# Patient Record
Sex: MALE | Race: WHITE | NOT HISPANIC OR LATINO | Employment: FULL TIME | URBAN - METROPOLITAN AREA
[De-identification: names, ages, dates, MRNs, and addresses within clinical notes are randomized per-mention and may not be internally consistent; named-entity substitution may affect disease eponyms.]

---

## 2017-02-22 ENCOUNTER — HOSPITAL ENCOUNTER (OUTPATIENT)
Dept: SLEEP MEDICINE | Facility: HOSPITAL | Age: 52
Discharge: HOME | End: 2017-02-22
Admitting: INTERNAL MEDICINE

## 2017-02-22 DIAGNOSIS — R06.81 WITNESSED APNEIC SPELLS: Primary | ICD-10-CM

## 2017-02-22 PROCEDURE — G0463 HOSPITAL OUTPT CLINIC VISIT: HCPCS

## 2017-02-23 NOTE — PROGRESS NOTES
DATE OF VISIT: 02/22/2017    PRIMARY CARE PHYSICIAN: Guido Basilio MD    REFERRING PHYSICIAN: Guido Baislio MD    REASON FOR VISIT: Suspected sleep-disordered breathing.     CHIEF COMPLAINT: Witnessed apneas.    HISTORY OF PRESENT ILLNESS: This patient is a very pleasant 52-year-old male who is here today for evaluation of suspected sleep-disordered breathing. He has been waking up frequently in the middle of the night and has been unable to go back to sleep. His wife has witnessed apneas. The patient also reports difficulty falling asleep in the past few years. He reports recurrent arousals at 3 to 4 a.m. He goes to bed at 11 p.m. and is up at 6 a.m. He gained 10 pounds in the past 5 years. He thinks that severity of snoring and hypersomnia might have gotten worse with the weight gain. He denies gasping respirations. He does report daytime sleepiness and also feels sleepy on long drives. He take short naps when she gets home during lunch break. He continues to snore in all sleep positions. He reports difficulty staying asleep.     PAST MEDICAL HISTORY: Hypertension.     CURRENT MEDICATIONS:  1. Atorvastatin.  2. CoQ10.  3. Amlodipine.  4. Irbesartan.  5. Cinnamon chromium.  6. Multivitamin.     FAMILY HISTORY: Significant for heart disease and hypertension.     SOCIAL HISTORY: He is a . He does not smoke. He does not drink. He drinks about caffeinated beverages a day.     REVIEW OF SYSTEMS: Significant for nasal congestion. Hilliard Sleepiness Score today is 3.    PHYSICAL EXAMINATION:  VITAL SIGNS: Reveals height 68 inches, weight 202 pounds, BMI 30, blood pressure 130/91, heart rate 67, and neck size 16.   HEENT: Class III Mallampati airway. Large size tongue.  NECK: Trachea midline.   LUNGS: Respiratory effort nonlabored. Clear to auscultation bilaterally.   HEART: Regular rate and rhythm. No murmurs, rubs.  ABDOMEN: Soft, nontender. Bowel sounds present.  EXTREMITIES: No evidence of edema. Pedal  pulses positive.     ASSESSMENT:  1. Hypersomnia.  2. Witnessed apneas.  3. Elevated BMI of 30.     PLAN: WE have discussed the pathophysiology of obstructive sleep apnea. We discussed the adverse outcomes associated with untreated sleep-disordered breathing. We discussed the treatment modalities of obstructive sleep apnea, such as use of a CPAP device versus an oral mandibular advancement device. Weight loss will be beneficial in order to reduce the severity of sleep-disordered breathing. Home sleep study will be scheduled to exclude the sleep-disordered potential contributing factor for the hypersomnia. He will follow up with Dr. Eder Garcia after completion of a home sleep study to review results.     I appreciate the opportunity to participate in this patient's care.         CELESTINA Levine  AZ:cheko  D:   02/22/2017 16:28:52  T:   02/23/2017 01:36:32  Job ID:   [id number]  Document ID:   [id number]  cc:

## 2017-03-09 RX ORDER — AMLODIPINE BESYLATE 5 MG/1
TABLET ORAL
Qty: 30 TABLET | Refills: 4 | Status: SHIPPED | OUTPATIENT
Start: 2017-03-09 | End: 2017-08-01 | Stop reason: SDUPTHER

## 2017-03-09 RX ORDER — IRBESARTAN 150 MG/1
TABLET ORAL
Qty: 30 TABLET | Refills: 4 | Status: SHIPPED | OUTPATIENT
Start: 2017-03-09 | End: 2017-08-01 | Stop reason: SDUPTHER

## 2017-03-09 RX ORDER — ATORVASTATIN CALCIUM 20 MG/1
TABLET, FILM COATED ORAL
Qty: 30 TABLET | Refills: 4 | Status: SHIPPED | OUTPATIENT
Start: 2017-03-09 | End: 2017-08-01 | Stop reason: SDUPTHER

## 2017-03-23 ENCOUNTER — HOSPITAL ENCOUNTER (OUTPATIENT)
Dept: SLEEP MEDICINE | Facility: HOSPITAL | Age: 52
Discharge: HOME | End: 2017-03-23
Admitting: INTERNAL MEDICINE

## 2017-03-23 DIAGNOSIS — R06.81 WITNESSED APNEIC SPELLS: ICD-10-CM

## 2017-03-23 PROCEDURE — 95806 SLEEP STUDY UNATT&RESP EFFT: CPT

## 2017-04-12 ENCOUNTER — TELEPHONE (OUTPATIENT)
Dept: SLEEP MEDICINE | Facility: HOSPITAL | Age: 52
End: 2017-04-12

## 2017-04-12 NOTE — TELEPHONE ENCOUNTER
LM with patient about sleep study results.  His AHI was 12.1 and his 02 dropped to 80%.  Order faxed to [facility]

## 2017-08-01 RX ORDER — ATORVASTATIN CALCIUM 20 MG/1
TABLET, FILM COATED ORAL
Qty: 30 TABLET | Refills: 3 | Status: SHIPPED | OUTPATIENT
Start: 2017-08-01 | End: 2017-09-30 | Stop reason: SDUPTHER

## 2017-08-01 RX ORDER — IRBESARTAN 150 MG/1
TABLET ORAL
Qty: 30 TABLET | Refills: 3 | Status: SHIPPED | OUTPATIENT
Start: 2017-08-01 | End: 2017-12-09 | Stop reason: SDUPTHER

## 2017-08-01 RX ORDER — AMLODIPINE BESYLATE 5 MG/1
TABLET ORAL
Qty: 30 TABLET | Refills: 3 | Status: SHIPPED | OUTPATIENT
Start: 2017-08-01 | End: 2017-12-09 | Stop reason: SDUPTHER

## 2017-08-11 ENCOUNTER — HOSPITAL ENCOUNTER (OUTPATIENT)
Dept: SLEEP MEDICINE | Facility: HOSPITAL | Age: 52
Discharge: HOME | End: 2017-08-11
Admitting: INTERNAL MEDICINE

## 2017-08-11 ENCOUNTER — DOCUMENTATION (OUTPATIENT)
Dept: PULMONOLOGY | Facility: HOSPITAL | Age: 52
End: 2017-08-11

## 2017-08-11 PROCEDURE — G0463 HOSPITAL OUTPT CLINIC VISIT: HCPCS

## 2017-08-16 NOTE — PROGRESS NOTES
[facility]  Telephone: [phone] / Fax: [phone] [place]  Telephone: [phone] / Fax: [phone] Jackie Shanks    PCP: Guido Basilio MD    Reason for visit: MOE f/u    Derian Lynn was last seen at [facility] sleep lab on 2/22/17 for initial consultation.  Home sleep study was scheduled and performed in March 2017.  Study demonstrated overall mild obstructive sleep apnea, but moderate to severe sleep apnea was seen in supine position sleep.  Associated snoring and sleep related hypoxemia was also noted.  Patient was recommended to initiate treatment with auto titrating CPAP device 5-15 centimeters of water.  He has been on the CPAP device now for several months and is here today to review his progress on the device.  He goes to bed at 11 PM and up at 6 AM.  Daytime sleepiness has improved.  He started dreaming more.  He uses nasal pillows that fit well.  He denies excessive air leak or dry mouth.  His ESS is 4.    Social history, no tobacco, drinks 2-3 caffeinated beverages a day, no energy drinks, no alcohol.    Review of systems, completely unremarkable.    Current Medications:    Current Outpatient Prescriptions:   •  amLODIPine (NORVASC) 5 MG tablet, TAKE 1 TABLET BY MOUTH ONE TIME A DAY , Disp: 30 tablet, Rfl: 3  •  atorvastatin (LIPITOR) 20 MG tablet, TAKE 1 TABLET BY MOUTH ONE TIME A DAY , Disp: 30 tablet, Rfl: 3  •  Chromium Picolinate 200 MCG capsule, Take  by mouth., Disp: , Rfl:   •  Cinnamon 500 MG tablet, Take  by mouth 2 (two) times a day., Disp: , Rfl:   •  coenzyme Q10 100 MG capsule, Take 100 mg by mouth 2 (two) times a day., Disp: , Rfl:   •  irbesartan (AVAPRO) 150 MG tablet, TAKE 1 TABLET BY MOUTH ONE TIME A DAY , Disp: 30 tablet, Rfl: 3  •  Multiple Vitamin (MULTI VITAMIN DAILY PO), Take  by mouth., Disp: , Rfl:    also entered in Sleep Questionnaire    Patient  has a past medical history of Diabetes mellitus; Hyperlipidemia; and Hypertension.    I have  reviewed the Past Medical History, Past Surgical History, Social History and Family History.             Vital Signs: height 69 inches, weight 205 pounds, BMI 30, blood pressure 127/85, heart rate 69           General: Alert. Cooperative. Well developed. No acute distress.           Throat: No oral lesions. No thrush. Moist mucous membranes.           Pharynx- Class III Mallampati airway, large tongue, no evidence of redundant lateral pharyngeal tissue             Head:  Normocephalic. Symmetrical. Atraumatic.              Nose: No septal deviation. No drainage.            Chest Wall -Normal shape. Symmetric expansion with respiration. No tenderness.             Neck:  Trachea midline.           Lungs:  Clear to auscultation bilaterally. No wheezes. No rhonchi. No rales. Respirations regular, even and unlabored.            Heart:  Regular rhythm and normal rate. Normal S1 and S2. No murmur.     Abdomen:  Soft, non-tender and non-distended. Normal bowel sounds. No masses.  Extremities:  Moves all extremities well. No edema.    Psychiatric: Normal mood and affect.    Testing:  · Download 7/12/17-8/10/17, 96% compliance with average nightly use of 4 hours and 54 minutes on auto CPAP 5-15 centimeters with average pressure of 8.4 and AHI of 2.7.  · HST 3/24/17 mild MOE with AHI 12 overall; supine AHI 22.      Impression:  Mild obstructive sleep apnea  Elevated BMI of 30  Hypertension- on medications    Plan:  Patient uses the CPAP device and benefits from its use in terms of reduction of hypersomnia and snoring. He started dreaming more and sleep quality has improved with CPAP. Weight loss will be strongly beneficial to reduce the severity of sleep-disordered breathing.  Caution during activities that require prolonged concentration is strongly advised if sleepiness returns. Changing of PAP supplies regularly is important for effective use. Patient needs to change cushion on the mask or plugs on nasal pillows along with  disposable filters once every month and change mask frame, tubing, headgear and Velcro straps every 6 months at the minimum.       Follow up with Dr. Garcia in 6 months    Thank you for allowing me to participate in your patient's care.      CELESTINA Levine  [facility]  Phone: [phone]      Part of this note may be an electronic transcription/translation of spoken language to printed text using the Dragon Dictation System.

## 2017-10-02 RX ORDER — ATORVASTATIN CALCIUM 20 MG/1
TABLET, FILM COATED ORAL
Qty: 30 TABLET | Refills: 2 | Status: SHIPPED | OUTPATIENT
Start: 2017-10-02 | End: 2017-11-04 | Stop reason: SDUPTHER

## 2017-11-06 RX ORDER — ATORVASTATIN CALCIUM 20 MG/1
TABLET, FILM COATED ORAL
Qty: 90 TABLET | Refills: 1 | Status: SHIPPED | OUTPATIENT
Start: 2017-11-06 | End: 2018-04-05 | Stop reason: SDUPTHER

## 2017-12-11 RX ORDER — IRBESARTAN 150 MG/1
TABLET ORAL
Qty: 14 TABLET | Refills: 0 | Status: SHIPPED | OUTPATIENT
Start: 2017-12-11 | End: 2018-04-05 | Stop reason: SDUPTHER

## 2017-12-11 RX ORDER — AMLODIPINE BESYLATE 5 MG/1
TABLET ORAL
Qty: 14 TABLET | Refills: 0 | Status: SHIPPED | OUTPATIENT
Start: 2017-12-11 | End: 2018-01-20 | Stop reason: SDUPTHER

## 2018-01-22 RX ORDER — ATORVASTATIN CALCIUM 20 MG/1
TABLET, FILM COATED ORAL
Qty: 90 TABLET | Refills: 1 | Status: SHIPPED | OUTPATIENT
Start: 2018-01-22 | End: 2018-04-06 | Stop reason: SDUPTHER

## 2018-01-22 RX ORDER — AMLODIPINE BESYLATE 5 MG/1
TABLET ORAL
Qty: 14 TABLET | Refills: 0 | Status: SHIPPED | OUTPATIENT
Start: 2018-01-22 | End: 2018-02-21 | Stop reason: SDUPTHER

## 2018-01-22 RX ORDER — IRBESARTAN 150 MG/1
TABLET ORAL
Qty: 30 TABLET | Refills: 2 | Status: SHIPPED | OUTPATIENT
Start: 2018-01-22 | End: 2018-04-06 | Stop reason: SDUPTHER

## 2018-02-09 ENCOUNTER — OFFICE VISIT (OUTPATIENT)
Dept: SLEEP MEDICINE | Facility: HOSPITAL | Age: 53
End: 2018-02-09
Attending: INTERNAL MEDICINE

## 2018-02-09 VITALS — WEIGHT: 208 LBS | HEIGHT: 68 IN | HEART RATE: 66 BPM | BODY MASS INDEX: 31.52 KG/M2

## 2018-02-09 DIAGNOSIS — G47.33 OBSTRUCTIVE SLEEP APNEA: Primary | ICD-10-CM

## 2018-02-09 DIAGNOSIS — E66.9 OBESITY (BMI 30-39.9): ICD-10-CM

## 2018-02-09 PROCEDURE — G0463 HOSPITAL OUTPT CLINIC VISIT: HCPCS

## 2018-02-09 NOTE — PROGRESS NOTES
"[facility] SLEEP MEDICINE  [address]  [address]rd [address]  [place] [address]  [phone]    PCP: Guido Basilio MD    Reason for visit:  Sleep disorders: MOE    Derian Lynn is a 53 y.o.male who was seen in the Sleep Disorders Center today. Since last visit he had HST and got setup with CPAP. He had fu with APRN 6 months ago. The device is working well for him. Sleeps from 11p to 6a. He wakes up rested. Infrequent EDS in afternoon. ESS 4. Uses nasal mask. Fits well. No air leaks. No dry mouth. No other health changes.    No cigs, no alc, 3 caff per day.    Current Medications:    Current Outpatient Prescriptions:   •  amLODIPine (NORVASC) 5 MG tablet, TAKE 1 TABLET BY MOUTH ONE TIME A DAY , Disp: 14 tablet, Rfl: 0  •  atorvastatin (LIPITOR) 20 MG tablet, TAKE 1 TABLET BY MOUTH ONE TIME A DAY , Disp: 90 tablet, Rfl: 1  •  atorvastatin (LIPITOR) 20 MG tablet, TAKE 1 TABLET BY MOUTH ONE TIME A DAY , Disp: 90 tablet, Rfl: 1  •  Chromium Picolinate 200 MCG capsule, Take  by mouth., Disp: , Rfl:   •  Cinnamon 500 MG tablet, Take  by mouth 2 (two) times a day., Disp: , Rfl:   •  coenzyme Q10 100 MG capsule, Take 100 mg by mouth 2 (two) times a day., Disp: , Rfl:   •  irbesartan (AVAPRO) 150 MG tablet, TAKE 1 TABLET BY MOUTH ONE TIME A DAY , Disp: 14 tablet, Rfl: 0  •  irbesartan (AVAPRO) 150 MG tablet, TAKE 1 TABLET BY MOUTH ONE TIME A DAY , Disp: 30 tablet, Rfl: 2  •  Multiple Vitamin (MULTI VITAMIN DAILY PO), Take  by mouth., Disp: , Rfl:    also entered in Sleep Questionnaire    Patient  has a past medical history of Diabetes mellitus; Hyperlipidemia; and Hypertension.   I have reviewed the Past Medical History, Past Surgical History, Social History and Family History in EPIC and Sleep Questionnaire.    Pertinent Positive Review of Systems of denies  Rest of Review of Systems was negative as recorded in Sleep Questionnaire.        Vital Signs: Pulse 66  Ht 172.7 cm (68\")  Wt 94.3 kg (208 lb)  " BMI 31.63 kg/m2        General: Alert. Cooperative. Well developed. No acute distress.             Head:  Normocephalic. Symmetrical. Atraumatic.              Nose: No septal deviation. No drainage.          Throat: No oral lesions. No thrush. Moist mucous membranes.    Tongue is normal and dentition is intact       Pharynx: Posterior pharyngeal pillars are narrow with Mallampati score of Class III     Chest Wall:  Normal shape. Symmetric expansion with respiration. No tenderness.             Neck:  Trachea midline.           Lungs:  Clear to auscultation bilaterally. No wheezes. No rhonchi. No rales. Respirations regular, even and unlabored.            Heart:  Regular rhythm and normal rate. Normal S1 and S2. No murmur.     Abdomen:  Soft, non-tender and non-distended. Normal bowel sounds. No masses.  Extremities:  Moves all extremities well. No edema.    Psychiatric: Normal mood and affect.    Study:  · 3/24/17- Apnea hypopneas index 12.1 events an hour indicating mild obstructive sleep apnea.  In supine position index is 22.5 indicating moderate obstructive sleep apnea.  Snoring for 20% of sleep time.  Saturation below 89% for 30 minutes.    Testing:  · 11/11/17-2/8/18 100% compliance with average nightly use of 5 hours and 48 min on auto CPAP 5-15cm with avg pressure of 8.3 and AHI 2.9    DME Company: [employer]    Impression:  1. Obstructive sleep apnea    2. Obesity (BMI 30-39.9)         Plan:  I reiterated the importance of effective treatment of obstructive sleep apnea with PAP machine.  Cardiovascular health risks of untreated sleep apnea were again reviewed.  Patient was asked to remain cautious if there is persistent hypersomnolence.    Change of PAP supplies regularly is important for effective use.  Change of cushion on the mask or plugs on nasal pillows along with disposable filters once every month and change of mask frame, tubing, headgear and Velcro straps every 6 months at the minimum was  reiterated.    This patient is compliant with PAP machine and benefits from its use.  Apnea hypopneas index is corrected/improved.  Daytime hypersomnolence has resolved.     Patient will follow up in this clinic in 1 year with APRN      Thank you for allowing me to participate in your patient's care.    Eder Garcia MD    Part of this note may be an electronic transcription/translation of spoken language to printed text using the Dragon Dictation System.

## 2018-02-21 PROBLEM — E66.9 OBESITY (BMI 30-39.9): Status: ACTIVE | Noted: 2018-02-21

## 2018-02-21 PROBLEM — G47.33 OBSTRUCTIVE SLEEP APNEA: Status: ACTIVE | Noted: 2018-02-21

## 2018-02-21 RX ORDER — AMLODIPINE BESYLATE 5 MG/1
TABLET ORAL
Qty: 14 TABLET | Refills: 0 | Status: SHIPPED | OUTPATIENT
Start: 2018-02-21 | End: 2018-03-17 | Stop reason: SDUPTHER

## 2018-03-19 RX ORDER — AMLODIPINE BESYLATE 5 MG/1
TABLET ORAL
Qty: 7 TABLET | Refills: 0 | Status: SHIPPED | OUTPATIENT
Start: 2018-03-19 | End: 2018-04-05 | Stop reason: SDUPTHER

## 2018-04-05 ENCOUNTER — OFFICE VISIT (OUTPATIENT)
Dept: FAMILY MEDICINE CLINIC | Facility: CLINIC | Age: 53
End: 2018-04-05

## 2018-04-05 VITALS
HEIGHT: 68 IN | HEART RATE: 78 BPM | TEMPERATURE: 96.4 F | BODY MASS INDEX: 31.95 KG/M2 | SYSTOLIC BLOOD PRESSURE: 132 MMHG | DIASTOLIC BLOOD PRESSURE: 84 MMHG | OXYGEN SATURATION: 99 % | WEIGHT: 210.8 LBS

## 2018-04-05 DIAGNOSIS — E78.49 OTHER HYPERLIPIDEMIA: ICD-10-CM

## 2018-04-05 DIAGNOSIS — I10 HYPERTENSION, ESSENTIAL: Primary | ICD-10-CM

## 2018-04-05 LAB
ALBUMIN SERPL-MCNC: 4.8 G/DL (ref 3.5–5.2)
ALBUMIN/GLOB SERPL: 1.5 G/DL
ALP SERPL-CCNC: 66 U/L (ref 39–117)
ALT SERPL W P-5'-P-CCNC: 42 U/L (ref 1–41)
ANION GAP SERPL CALCULATED.3IONS-SCNC: 13.7 MMOL/L
AST SERPL-CCNC: 26 U/L (ref 1–40)
BILIRUB SERPL-MCNC: 0.5 MG/DL (ref 0.1–1.2)
BUN BLD-MCNC: 11 MG/DL (ref 6–20)
BUN/CREAT SERPL: 12.6 (ref 7–25)
CALCIUM SPEC-SCNC: 9.5 MG/DL (ref 8.6–10.5)
CHLORIDE SERPL-SCNC: 102 MMOL/L (ref 98–107)
CHOLEST SERPL-MCNC: 130 MG/DL (ref 0–200)
CO2 SERPL-SCNC: 25.3 MMOL/L (ref 22–29)
CREAT BLD-MCNC: 0.87 MG/DL (ref 0.76–1.27)
GFR SERPL CREATININE-BSD FRML MDRD: 92 ML/MIN/1.73
GLOBULIN UR ELPH-MCNC: 3.1 GM/DL
GLUCOSE BLD-MCNC: 87 MG/DL (ref 65–99)
HDLC SERPL-MCNC: 37 MG/DL (ref 40–60)
LDLC SERPL CALC-MCNC: 63 MG/DL (ref 0–100)
LDLC/HDLC SERPL: 1.7 {RATIO}
POTASSIUM BLD-SCNC: 4.1 MMOL/L (ref 3.5–5.2)
PROT SERPL-MCNC: 7.9 G/DL (ref 6–8.5)
SODIUM BLD-SCNC: 141 MMOL/L (ref 136–145)
TRIGL SERPL-MCNC: 150 MG/DL (ref 0–150)
VLDLC SERPL-MCNC: 30 MG/DL (ref 5–40)

## 2018-04-05 PROCEDURE — 80061 LIPID PANEL: CPT | Performed by: INTERNAL MEDICINE

## 2018-04-05 PROCEDURE — 99213 OFFICE O/P EST LOW 20 MIN: CPT | Performed by: INTERNAL MEDICINE

## 2018-04-05 PROCEDURE — 36415 COLL VENOUS BLD VENIPUNCTURE: CPT | Performed by: INTERNAL MEDICINE

## 2018-04-05 PROCEDURE — 80053 COMPREHEN METABOLIC PANEL: CPT | Performed by: INTERNAL MEDICINE

## 2018-04-05 RX ORDER — IRBESARTAN 150 MG/1
TABLET ORAL
Qty: 30 TABLET | Refills: 11 | Status: SHIPPED | OUTPATIENT
Start: 2018-04-05 | End: 2019-06-07 | Stop reason: ALTCHOICE

## 2018-04-05 RX ORDER — AMLODIPINE BESYLATE 5 MG/1
5 TABLET ORAL DAILY
Qty: 30 TABLET | Refills: 11 | Status: SHIPPED | OUTPATIENT
Start: 2018-04-05 | End: 2018-04-06 | Stop reason: SDUPTHER

## 2018-04-05 RX ORDER — ATORVASTATIN CALCIUM 20 MG/1
20 TABLET, FILM COATED ORAL DAILY
Qty: 30 TABLET | Refills: 5 | Status: CANCELLED | OUTPATIENT
Start: 2018-04-05

## 2018-04-05 RX ORDER — ATORVASTATIN CALCIUM 20 MG/1
20 TABLET, FILM COATED ORAL DAILY
Qty: 90 TABLET | Refills: 5 | Status: SHIPPED | OUTPATIENT
Start: 2018-04-05 | End: 2018-04-06 | Stop reason: SDUPTHER

## 2018-04-05 NOTE — PROGRESS NOTES
Subjective   Derian Lynn is a 53 y.o. male.   Follow-up on blood pressure lipids patient doing well  History of Present Illness   No complaints compliant with medication blood pressure satisfactory elsewhere.    Review of Systems   All other systems reviewed and are negative.      Objective   Vitals:    04/05/18 1405   BP: 132/84   Pulse: 78   Temp: 96.4 °F (35.8 °C)   SpO2: 99%   Weight: 95.6 kg (210 lb 12.8 oz)     Physical Exam   Constitutional: He appears well-developed and well-nourished.   HENT:   Head: Normocephalic and atraumatic.   Eyes: Conjunctivae are normal. Pupils are equal, round, and reactive to light.   Cardiovascular: Normal rate, regular rhythm and normal heart sounds.    Pulmonary/Chest: Effort normal and breath sounds normal.   Abdominal: Soft. Bowel sounds are normal.   Skin: Skin is warm and dry.   Nursing note and vitals reviewed.      No results found for: INR    Procedures    Assessment/Plan     1.  Hypertension plan continue present medicine recheck in 6-12 months    2.  Hyperlipidemia plan await lab if good recheck in 6 months.    Much of this encounter note is an electronic transcription/translation of spoken language to printed text.  The electronic translation of spoken language may permit erroneous, or at times, nonsensical words or phrases to be inadvertently transcribed.  Although I have reviewed the note for such errors, some may still exist. If there are questions or for further clarification, please contact me.

## 2018-04-06 RX ORDER — ATORVASTATIN CALCIUM 20 MG/1
20 TABLET, FILM COATED ORAL DAILY
Qty: 30 TABLET | Refills: 11 | Status: SHIPPED | OUTPATIENT
Start: 2018-04-06 | End: 2019-06-07

## 2018-04-06 RX ORDER — AMLODIPINE BESYLATE 5 MG/1
5 TABLET ORAL DAILY
Qty: 30 TABLET | Refills: 11 | Status: SHIPPED | OUTPATIENT
Start: 2018-04-06 | End: 2019-04-10 | Stop reason: SDUPTHER

## 2018-04-06 RX ORDER — IRBESARTAN 150 MG/1
150 TABLET ORAL DAILY
Qty: 30 TABLET | Refills: 11 | Status: SHIPPED | OUTPATIENT
Start: 2018-04-06 | End: 2019-06-07 | Stop reason: SDUPTHER

## 2019-01-09 RX ORDER — VALSARTAN 160 MG/1
80 TABLET ORAL DAILY
Qty: 30 TABLET | Refills: 0 | Status: SHIPPED | OUTPATIENT
Start: 2019-01-09 | End: 2019-03-02 | Stop reason: SDUPTHER

## 2019-02-07 ENCOUNTER — OFFICE VISIT (OUTPATIENT)
Dept: SLEEP MEDICINE | Facility: HOSPITAL | Age: 54
End: 2019-02-07

## 2019-02-07 VITALS
BODY MASS INDEX: 31.28 KG/M2 | DIASTOLIC BLOOD PRESSURE: 83 MMHG | HEIGHT: 68 IN | HEART RATE: 72 BPM | SYSTOLIC BLOOD PRESSURE: 131 MMHG | WEIGHT: 206.4 LBS

## 2019-02-07 DIAGNOSIS — G47.33 OBSTRUCTIVE SLEEP APNEA: Primary | ICD-10-CM

## 2019-02-07 DIAGNOSIS — E66.9 OBESITY (BMI 30-39.9): ICD-10-CM

## 2019-02-07 PROCEDURE — G0463 HOSPITAL OUTPT CLINIC VISIT: HCPCS

## 2019-02-07 NOTE — PROGRESS NOTES
"[facility]  Telephone: [phone] / Fax: [phone] [place]  Telephone: [phone] / Fax: [phone] Jackie Shanks    PCP: Guido Basilio Jr., MD    Reason for visit: MOE f/u    Derian Lynn is a 53 y.o.male  was last seen at [facility] sleep lab 1 year ago and here today to review his progress on the CPAP device. He uses nasal pillows but they slip when he rolls and wife hears air leak. He is doing well on the CPAP otherwise and denies gasping respirations, snoring or witnessed apneas. No significant dry mouth is reported either. His BP is well controlled. His sleep schedule is 11pm-5:45am. ESS is 6. DME is [facility].     SH- no tobacco, 0 alcohol, 0 energy drinks, 3 caffeine per day.    ROS negative.    Current Medications:    Current Outpatient Medications:   •  amLODIPine (NORVASC) 5 MG tablet, Take 1 tablet by mouth Daily., Disp: 30 tablet, Rfl: 11  •  atorvastatin (LIPITOR) 20 MG tablet, Take 1 tablet by mouth Daily. 200001, Disp: 30 tablet, Rfl: 11  •  Chromium Picolinate 200 MCG capsule, Take  by mouth., Disp: , Rfl:   •  Cinnamon 500 MG tablet, Take  by mouth 2 (two) times a day., Disp: , Rfl:   •  coenzyme Q10 100 MG capsule, Take 100 mg by mouth 2 (two) times a day., Disp: , Rfl:   •  irbesartan (AVAPRO) 150 MG tablet, Take 1 daily, Disp: 30 tablet, Rfl: 11  •  irbesartan (AVAPRO) 150 MG tablet, Take 1 tablet by mouth Daily. 200001, Disp: 30 tablet, Rfl: 11  •  Multiple Vitamin (MULTI VITAMIN DAILY PO), Take  by mouth., Disp: , Rfl:   •  valsartan (DIOVAN) 160 MG tablet, Take 0.5 tablets by mouth Daily. For blood pressure, Disp: 30 tablet, Rfl: 0   also entered in Sleep Questionnaire    Patient  has a past medical history of Diabetes mellitus (CMS/HCC), Hyperlipidemia, and Hypertension.    I have reviewed the Past Medical History, Past Surgical History, Social History and Family History.            ESS  6   Vital Signs /83   Pulse 72   Ht 172.7 cm (68\")   Wt 93.6 " kg (206 lb 6.4 oz)   BMI 31.38 kg/m²  Body mass index is 31.38 kg/m².    General Alert and oriented. No acute distress noted   Pharynx/Throat Class IV Mallampati airway, large tongue, no evidence of redundant lateral pharyngeal tissue. No oral lesions. No thrush. Moist mucous membranes.   Head Normocephalic. Symmetrical. Atraumatic.    Nose No septal deviation. No drainage   Chest Wall Normal shape. Symmetric expansion with respiration. No tenderness.   Neck Trachea midline, no thyromegaly or adenopathy    Lungs Clear to auscultation bilaterally. No wheezes. No rhonchi. No rales. Respirations regular, even and unlabored.   Heart Regular rhythm and normal rate. Normal S1 and S2. No murmur   Abdomen Soft, non-tender and non-distended. Normal bowel sounds. No masses.   Extremities Moves all extremities well. No edema   Psychiatric Normal mood and affect.     Testing:  · Download-1/8/19-2/6/19, 93% use with average nightly use of 5 hours and 38 minutes on auto CPAP 5-15cm with avg pressure 8.2, AHI 3.0.    · Study-3/24/17- Apnea hypopneas index 12.1 events an hour indicating mild obstructive sleep apnea.  In supine position index is 22.5 indicating moderate obstructive sleep apnea.  Snoring for 20% of sleep time.  Saturation below 89% for 30 minutes.       Impression:  1. Obstructive sleep apnea    2. Obesity (BMI 30-39.9)          Plan:  Patient uses the CPAP device and benefits from its use in terms of reduction of hypersomnia and snoring.Weight loss will be strongly beneficial to reduce the severity of sleep-disordered breathing.  Caution during activities that require prolonged concentration is strongly advised if sleepiness returns. Changing of PAP supplies regularly is important for effective use. Patient needs to change cushion on the mask or plugs on nasal pillows along with disposable filters once every month and change mask frame, tubing, headgear and Velcro straps every 6 months at the minimum.       Follow  up with Dr. Garcia in one year    Thank you for allowing me to participate in your patient's care.      CELESTINA Levine  [place] [facility]  Phone: [phone]      Part of this note may be an electronic transcription/translation of spoken language to printed text using the Dragon Dictation System.

## 2019-03-04 RX ORDER — VALSARTAN 80 MG/1
TABLET ORAL
Qty: 30 TABLET | Refills: 0 | Status: SHIPPED | OUTPATIENT
Start: 2019-03-04 | End: 2019-04-06 | Stop reason: SDUPTHER

## 2019-04-08 RX ORDER — VALSARTAN 80 MG/1
TABLET ORAL
Qty: 30 TABLET | Refills: 0 | OUTPATIENT
Start: 2019-04-08

## 2019-04-08 RX ORDER — VALSARTAN 80 MG/1
TABLET ORAL
Qty: 30 TABLET | Refills: 0 | Status: SHIPPED | OUTPATIENT
Start: 2019-04-08 | End: 2019-04-26 | Stop reason: SDUPTHER

## 2019-04-08 RX ORDER — AMLODIPINE BESYLATE 5 MG/1
TABLET ORAL
Qty: 30 TABLET | Refills: 10 | OUTPATIENT
Start: 2019-04-08

## 2019-04-10 RX ORDER — AMLODIPINE BESYLATE 5 MG/1
TABLET ORAL
Qty: 30 TABLET | Refills: 0 | Status: SHIPPED | OUTPATIENT
Start: 2019-04-10 | End: 2019-04-26 | Stop reason: SDUPTHER

## 2019-04-29 RX ORDER — AMLODIPINE BESYLATE 5 MG/1
TABLET ORAL
Qty: 30 TABLET | Refills: 0 | Status: SHIPPED | OUTPATIENT
Start: 2019-04-29 | End: 2019-06-07

## 2019-04-29 RX ORDER — VALSARTAN 80 MG/1
TABLET ORAL
Qty: 30 TABLET | Refills: 0 | Status: SHIPPED | OUTPATIENT
Start: 2019-04-29 | End: 2019-06-07

## 2019-05-28 RX ORDER — AMLODIPINE BESYLATE 5 MG/1
TABLET ORAL
Qty: 30 TABLET | Refills: 0 | OUTPATIENT
Start: 2019-05-28

## 2019-05-28 RX ORDER — VALSARTAN 80 MG/1
TABLET ORAL
Qty: 30 TABLET | Refills: 0 | OUTPATIENT
Start: 2019-05-28

## 2019-05-31 RX ORDER — VALSARTAN 80 MG/1
TABLET ORAL
Qty: 30 TABLET | Refills: 0 | OUTPATIENT
Start: 2019-05-31

## 2019-05-31 RX ORDER — AMLODIPINE BESYLATE 5 MG/1
TABLET ORAL
Qty: 30 TABLET | Refills: 0 | OUTPATIENT
Start: 2019-05-31

## 2019-06-07 ENCOUNTER — OFFICE VISIT (OUTPATIENT)
Dept: FAMILY MEDICINE CLINIC | Facility: CLINIC | Age: 54
End: 2019-06-07

## 2019-06-07 VITALS
DIASTOLIC BLOOD PRESSURE: 88 MMHG | HEIGHT: 68 IN | SYSTOLIC BLOOD PRESSURE: 112 MMHG | BODY MASS INDEX: 31.4 KG/M2 | OXYGEN SATURATION: 98 % | WEIGHT: 207.2 LBS | HEART RATE: 68 BPM | TEMPERATURE: 97.8 F

## 2019-06-07 DIAGNOSIS — I10 HYPERTENSION, ESSENTIAL: Primary | ICD-10-CM

## 2019-06-07 DIAGNOSIS — R73.9 BLOOD GLUCOSE ELEVATED: ICD-10-CM

## 2019-06-07 DIAGNOSIS — E78.5 HYPERLIPIDEMIA, UNSPECIFIED HYPERLIPIDEMIA TYPE: ICD-10-CM

## 2019-06-07 LAB
ALBUMIN SERPL-MCNC: 4.7 G/DL (ref 3.5–5.2)
ALBUMIN/GLOB SERPL: 1.6 G/DL
ALP SERPL-CCNC: 57 U/L (ref 39–117)
ALT SERPL W P-5'-P-CCNC: 33 U/L (ref 1–41)
ANION GAP SERPL CALCULATED.3IONS-SCNC: 14.1 MMOL/L
AST SERPL-CCNC: 23 U/L (ref 1–40)
BILIRUB SERPL-MCNC: 0.4 MG/DL (ref 0.2–1.2)
BUN BLD-MCNC: 13 MG/DL (ref 6–20)
BUN/CREAT SERPL: 14 (ref 7–25)
CALCIUM SPEC-SCNC: 9.4 MG/DL (ref 8.6–10.5)
CHLORIDE SERPL-SCNC: 102 MMOL/L (ref 98–107)
CHOLEST SERPL-MCNC: 135 MG/DL (ref 0–200)
CO2 SERPL-SCNC: 26.9 MMOL/L (ref 22–29)
CREAT BLD-MCNC: 0.93 MG/DL (ref 0.76–1.27)
GFR SERPL CREATININE-BSD FRML MDRD: 85 ML/MIN/1.73
GLOBULIN UR ELPH-MCNC: 2.9 GM/DL
GLUCOSE BLD-MCNC: 109 MG/DL (ref 65–99)
HDLC SERPL-MCNC: 37 MG/DL (ref 40–60)
LDLC SERPL CALC-MCNC: 70 MG/DL (ref 0–100)
LDLC/HDLC SERPL: 1.88 {RATIO}
POTASSIUM BLD-SCNC: 4.5 MMOL/L (ref 3.5–5.2)
PROT SERPL-MCNC: 7.6 G/DL (ref 6–8.5)
SODIUM BLD-SCNC: 143 MMOL/L (ref 136–145)
TRIGL SERPL-MCNC: 142 MG/DL (ref 0–150)
VLDLC SERPL-MCNC: 28.4 MG/DL (ref 5–40)

## 2019-06-07 PROCEDURE — 36415 COLL VENOUS BLD VENIPUNCTURE: CPT | Performed by: INTERNAL MEDICINE

## 2019-06-07 PROCEDURE — 99214 OFFICE O/P EST MOD 30 MIN: CPT | Performed by: INTERNAL MEDICINE

## 2019-06-07 PROCEDURE — 80061 LIPID PANEL: CPT | Performed by: INTERNAL MEDICINE

## 2019-06-07 PROCEDURE — 80053 COMPREHEN METABOLIC PANEL: CPT | Performed by: INTERNAL MEDICINE

## 2019-06-07 RX ORDER — VALSARTAN 80 MG/1
80 TABLET ORAL DAILY
Qty: 30 TABLET | Refills: 0 | Status: SHIPPED | OUTPATIENT
Start: 2019-06-07 | End: 2019-07-02 | Stop reason: SDUPTHER

## 2019-06-07 RX ORDER — AMLODIPINE BESYLATE 5 MG/1
5 TABLET ORAL DAILY
Qty: 30 TABLET | Refills: 0 | Status: SHIPPED | OUTPATIENT
Start: 2019-06-07 | End: 2019-07-02 | Stop reason: SDUPTHER

## 2019-06-07 RX ORDER — ATORVASTATIN CALCIUM 20 MG/1
20 TABLET, FILM COATED ORAL DAILY
Qty: 90 TABLET | Refills: 3 | Status: SHIPPED | OUTPATIENT
Start: 2019-06-07 | End: 2020-06-26

## 2019-06-07 NOTE — PROGRESS NOTES
Subjective   Derian Lynn is a 54 y.o. male.   Patient due for follow-up is been roughly a year or more.  Follow-up on blood pressure and lipids is not been on medications but states that  History of Present Illness   Follow-up on blood pressure lipids patient does have mild elevated glucose we will check for control he is trying to watch his weight get glucose only on this.  No personal history for diabetes.  Family history noncontributory drug allergies are none.  Patient is a non-smoker  Review of Systems   All other systems reviewed and are negative.      Objective   Vitals:    06/07/19 1413   BP: 112/88   Pulse: 68   Temp: 97.8 °F (36.6 °C)   SpO2: 98%   Weight: 94 kg (207 lb 3.2 oz)     Physical Exam   Constitutional: He appears well-developed and well-nourished.   HENT:   Head: Normocephalic and atraumatic.   Eyes: Conjunctivae are normal. Pupils are equal, round, and reactive to light.   Cardiovascular: Normal rate, regular rhythm and normal heart sounds.   Pulmonary/Chest: Effort normal and breath sounds normal.   Abdominal: Soft. Bowel sounds are normal.   Neurological: He is alert.   Unremarkable gait and station   Skin: Skin is warm and dry.   Nursing note and vitals reviewed.      No results found for: INR    Procedures    Assessment/Plan     1.  Hypertension controlled plan continue present medication recheck 1 years time    2.  Hyperlipidemia await pending labs if satisfactory will need follow-up in 6 months time    3.  Elevated blood glucose plan anticipate continuing chromium picolinate and cinnamon 1 teaspoon a day await pending lab results on Monday    Much of this encounter note is an electronic transcription/translation of spoken language to printed text.  The electronic translation of spoken language may permit erroneous, or at times, nonsensical words or phrases to be inadvertently transcribed.  Although I have reviewed the note for such errors, some may still exist. If there are  questions or for further clarification, please contact me.

## 2019-07-03 RX ORDER — VALSARTAN 80 MG/1
TABLET ORAL
Qty: 30 TABLET | Refills: 0 | Status: SHIPPED | OUTPATIENT
Start: 2019-07-03 | End: 2019-08-02 | Stop reason: SDUPTHER

## 2019-07-03 RX ORDER — AMLODIPINE BESYLATE 5 MG/1
TABLET ORAL
Qty: 30 TABLET | Refills: 0 | Status: SHIPPED | OUTPATIENT
Start: 2019-07-03 | End: 2019-08-02 | Stop reason: SDUPTHER

## 2019-08-05 RX ORDER — AMLODIPINE BESYLATE 5 MG/1
TABLET ORAL
Qty: 30 TABLET | Refills: 0 | Status: SHIPPED | OUTPATIENT
Start: 2019-08-05 | End: 2019-08-31 | Stop reason: SDUPTHER

## 2019-08-05 RX ORDER — VALSARTAN 80 MG/1
TABLET ORAL
Qty: 30 TABLET | Refills: 0 | Status: SHIPPED | OUTPATIENT
Start: 2019-08-05 | End: 2019-09-04 | Stop reason: SDUPTHER

## 2019-09-03 RX ORDER — AMLODIPINE BESYLATE 5 MG/1
TABLET ORAL
Qty: 30 TABLET | Refills: 0 | Status: SHIPPED | OUTPATIENT
Start: 2019-09-03 | End: 2019-10-01 | Stop reason: SDUPTHER

## 2019-09-04 RX ORDER — VALSARTAN 80 MG/1
TABLET ORAL
Qty: 30 TABLET | Refills: 0 | Status: SHIPPED | OUTPATIENT
Start: 2019-09-04 | End: 2019-10-01 | Stop reason: SDUPTHER

## 2019-10-02 RX ORDER — AMLODIPINE BESYLATE 5 MG/1
TABLET ORAL
Qty: 30 TABLET | Refills: 0 | Status: SHIPPED | OUTPATIENT
Start: 2019-10-02 | End: 2019-10-25 | Stop reason: SDUPTHER

## 2019-10-02 RX ORDER — VALSARTAN 80 MG/1
TABLET ORAL
Qty: 30 TABLET | Refills: 0 | Status: SHIPPED | OUTPATIENT
Start: 2019-10-02 | End: 2019-10-25 | Stop reason: SDUPTHER

## 2019-10-28 RX ORDER — VALSARTAN 80 MG/1
TABLET ORAL
Qty: 30 TABLET | Refills: 0 | Status: SHIPPED | OUTPATIENT
Start: 2019-10-28 | End: 2019-11-23 | Stop reason: SDUPTHER

## 2019-10-28 RX ORDER — AMLODIPINE BESYLATE 5 MG/1
TABLET ORAL
Qty: 30 TABLET | Refills: 0 | Status: SHIPPED | OUTPATIENT
Start: 2019-10-28 | End: 2019-11-23 | Stop reason: SDUPTHER

## 2019-11-25 RX ORDER — AMLODIPINE BESYLATE 5 MG/1
TABLET ORAL
Qty: 30 TABLET | Refills: 0 | Status: SHIPPED | OUTPATIENT
Start: 2019-11-25 | End: 2019-12-23

## 2019-11-25 RX ORDER — VALSARTAN 80 MG/1
TABLET ORAL
Qty: 30 TABLET | Refills: 0 | Status: SHIPPED | OUTPATIENT
Start: 2019-11-25 | End: 2019-12-23

## 2019-12-23 RX ORDER — VALSARTAN 80 MG/1
TABLET ORAL
Qty: 30 TABLET | Refills: 0 | Status: SHIPPED | OUTPATIENT
Start: 2019-12-23 | End: 2020-01-27

## 2019-12-23 RX ORDER — AMLODIPINE BESYLATE 5 MG/1
TABLET ORAL
Qty: 30 TABLET | Refills: 0 | Status: SHIPPED | OUTPATIENT
Start: 2019-12-23 | End: 2020-01-27

## 2020-01-27 RX ORDER — AMLODIPINE BESYLATE 5 MG/1
TABLET ORAL
Qty: 30 TABLET | Refills: 0 | Status: SHIPPED | OUTPATIENT
Start: 2020-01-27 | End: 2020-03-02

## 2020-01-27 RX ORDER — VALSARTAN 80 MG/1
TABLET ORAL
Qty: 30 TABLET | Refills: 0 | Status: SHIPPED | OUTPATIENT
Start: 2020-01-27 | End: 2020-03-02

## 2020-02-07 ENCOUNTER — OFFICE VISIT (OUTPATIENT)
Dept: SLEEP MEDICINE | Facility: HOSPITAL | Age: 55
End: 2020-02-07

## 2020-02-07 VITALS
WEIGHT: 211.2 LBS | BODY MASS INDEX: 32.01 KG/M2 | OXYGEN SATURATION: 98 % | SYSTOLIC BLOOD PRESSURE: 142 MMHG | HEIGHT: 68 IN | DIASTOLIC BLOOD PRESSURE: 74 MMHG | HEART RATE: 80 BPM

## 2020-02-07 DIAGNOSIS — G47.33 OBSTRUCTIVE SLEEP APNEA: Primary | ICD-10-CM

## 2020-02-07 DIAGNOSIS — E66.9 OBESITY (BMI 30-39.9): ICD-10-CM

## 2020-02-07 PROCEDURE — G0463 HOSPITAL OUTPT CLINIC VISIT: HCPCS

## 2020-02-07 NOTE — PROGRESS NOTES
"[facility] SLEEP MEDICINE  [address] [facility]  3RD FLOOR  [facility] [address]  [phone]    PCP: Guido Basilio Jr., MD    Reason for visit:  Sleep disorders: MOE    Derian is a 55 y.o.male who was seen in the Sleep Disorders Center today. Annual fu. He sleeps from 11pm to 5am. He uses nasal pillows, changes regularly, no sig air leaks, no dry mouth. He wakes up rested, sometimes wakes up at 3 am on occasion.  [place] Sleepiness Scale is 4. Caffeine 3 per day. Alcohol 0 per week.    Derian  reports that he has never smoked. He does not have any smokeless tobacco history on file.    Pertinent Positive Review of Systems of denies  Rest of Review of Systems was negative as recorded in Sleep Questionnaire.    Patient  has a past medical history of Diabetes mellitus (CMS/HCC), Hyperlipidemia, and Hypertension.       Current Medications:    Current Outpatient Medications:   •  amLODIPine (NORVASC) 5 MG tablet, TAKE 1 TABLET BY MOUTH ONE TIME A DAY , Disp: 30 tablet, Rfl: 0  •  atorvastatin (LIPITOR) 20 MG tablet, Take 1 tablet by mouth Daily. 200001, Disp: 90 tablet, Rfl: 3  •  Chromium Picolinate 200 MCG capsule, Take  by mouth., Disp: , Rfl:   •  Cinnamon 500 MG tablet, Take  by mouth 2 (two) times a day., Disp: , Rfl:   •  coenzyme Q10 100 MG capsule, Take 100 mg by mouth 2 (two) times a day., Disp: , Rfl:   •  Multiple Vitamin (MULTI VITAMIN DAILY PO), Take  by mouth., Disp: , Rfl:   •  valsartan (DIOVAN) 80 MG tablet, TAKE 1 TABLET BY MOUTH ONE TIME A DAY FOR BLOOD PRESSURE, Disp: 30 tablet, Rfl: 0     also entered in Sleep Questionnaire         Vital Signs: /74 (BP Location: Left arm, Patient Position: Sitting)   Pulse 80   Ht 172.7 cm (68\")   Wt 95.8 kg (211 lb 3.2 oz)   SpO2 98%   BMI 32.11 kg/m²     Body mass index is 32.11 kg/m².       Tongue: normal      Dentition: good       Pharynx: Posterior pharyngeal pillars are wide   Mallampatti: III (soft and hard palate and base of uvula " visible)        General: Alert. Cooperative. Well developed. No acute distress.             Head:  Normocephalic. Symmetrical. Atraumatic.              Nose: No septal deviation. No drainage.          Throat: No oral lesions. No thrush. Moist mucous membranes.    Chest Wall:  Normal shape. Symmetric expansion with respiration. No tenderness.             Neck:  Trachea midline.           Lungs:  Clear to auscultation bilaterally. No wheezes. No rhonchi. No rales. Respirations regular, even and unlabored.            Heart:  Regular rhythm and normal rate. Normal S1 and S2. No murmur.     Abdomen:  Soft, non-tender and non-distended. Normal bowel sounds. No masses.  Extremities:  Moves all extremities well. No edema.    Psychiatric: Normal mood and affect.    Study:  · 3/24/17- Apnea hypopneas index 12.1 events an hour indicating mild obstructive sleep apnea.  In supine position index is 22.5 indicating moderate obstructive sleep apnea.  Snoring for 20% of sleep time.  Saturation below 89% for 30 minutes.    Testing:  · Compliance for 90 days 97.8% with average usage 5 hours 40 minutes.  AHI is 2.6 with average pressure 8 cm water pressure.  Auto CPAP between 5 and 15 cm.    Listen Up: ecomom    Impression:  1. Obstructive sleep apnea    2. Obesity (BMI 30-39.9)        Plan:  Derian is compliant and benefits from use of his CPAP.  Wakes up rested and refreshed.  Advised to change supplies regularly.  He sometimes wakes up at 3 AM.  If the problem persists and becomes more regular than he was asked to call our office for further instructions.    I reiterated the importance of effective treatment of obstructive sleep apnea with PAP machine.  Cardiovascular health risks of untreated sleep apnea were again reviewed.  Patient was asked to remain cautious if there is persistent hypersomnolence. The benefit of weight loss in reducing severity of obstructive sleep apnea was discussed.  Patient would benefit from adhering to  a strict diet to achieve ideal BMI.     Change of PAP supplies regularly is important for effective use.  Change of cushion on the mask or plugs on nasal pillows along with disposable filters once every month and change of mask frame, tubing, headgear and Velcro straps every 6 months at the minimum was reiterated.    This patient is compliant with PAP machine and benefits from its use.  Apnea hypopneas index is corrected/improved.  Daytime hypersomnolence has resolved.     Patient will follow up in this clinic in 1 year APRN    Thank you for allowing me to participate in your patient's care.    Eder Garcia MD    Part of this note may be an electronic transcription/translation of spoken language to printed text using the Dragon Dictation System.

## 2020-03-02 RX ORDER — VALSARTAN 80 MG/1
TABLET ORAL
Qty: 30 TABLET | Refills: 0 | Status: SHIPPED | OUTPATIENT
Start: 2020-03-02 | End: 2020-03-30

## 2020-03-02 RX ORDER — AMLODIPINE BESYLATE 5 MG/1
TABLET ORAL
Qty: 30 TABLET | Refills: 0 | Status: SHIPPED | OUTPATIENT
Start: 2020-03-02 | End: 2020-03-30

## 2020-03-30 RX ORDER — VALSARTAN 80 MG/1
TABLET ORAL
Qty: 30 TABLET | Refills: 0 | Status: SHIPPED | OUTPATIENT
Start: 2020-03-30 | End: 2020-05-04

## 2020-03-30 RX ORDER — AMLODIPINE BESYLATE 5 MG/1
TABLET ORAL
Qty: 30 TABLET | Refills: 0 | Status: SHIPPED | OUTPATIENT
Start: 2020-03-30 | End: 2020-05-04

## 2020-05-04 RX ORDER — VALSARTAN 160 MG/1
TABLET ORAL
Qty: 15 TABLET | Refills: 0 | Status: SHIPPED | OUTPATIENT
Start: 2020-05-04

## 2020-05-04 RX ORDER — AMLODIPINE BESYLATE 5 MG/1
TABLET ORAL
Qty: 30 TABLET | Refills: 0 | Status: SHIPPED | OUTPATIENT
Start: 2020-05-04 | End: 2020-06-01

## 2020-05-06 ENCOUNTER — TELEPHONE (OUTPATIENT)
Dept: FAMILY MEDICINE CLINIC | Facility: CLINIC | Age: 55
End: 2020-05-06

## 2020-05-06 RX ORDER — IRBESARTAN 150 MG/1
150 TABLET ORAL DAILY
Qty: 90 TABLET | Refills: 3 | Status: SHIPPED | OUTPATIENT
Start: 2020-05-06

## 2020-06-01 RX ORDER — AMLODIPINE BESYLATE 5 MG/1
TABLET ORAL
Qty: 30 TABLET | Refills: 0 | Status: SHIPPED | OUTPATIENT
Start: 2020-06-01 | End: 2020-06-26

## 2020-06-26 RX ORDER — ATORVASTATIN CALCIUM 20 MG/1
TABLET, FILM COATED ORAL
Qty: 90 TABLET | Refills: 0 | Status: SHIPPED | OUTPATIENT
Start: 2020-06-26 | End: 2020-09-28

## 2020-06-26 RX ORDER — AMLODIPINE BESYLATE 5 MG/1
TABLET ORAL
Qty: 90 TABLET | Refills: 0 | Status: SHIPPED | OUTPATIENT
Start: 2020-06-26 | End: 2020-09-28

## 2020-09-28 RX ORDER — ATORVASTATIN CALCIUM 20 MG/1
TABLET, FILM COATED ORAL
Qty: 30 TABLET | Refills: 0 | Status: SHIPPED | OUTPATIENT
Start: 2020-09-28 | End: 2020-09-29

## 2020-09-28 RX ORDER — AMLODIPINE BESYLATE 5 MG/1
TABLET ORAL
Qty: 30 TABLET | Refills: 0 | Status: SHIPPED | OUTPATIENT
Start: 2020-09-28 | End: 2020-10-26

## 2020-09-29 RX ORDER — ATORVASTATIN CALCIUM 20 MG/1
TABLET, FILM COATED ORAL
Qty: 90 TABLET | Refills: 0 | Status: SHIPPED | OUTPATIENT
Start: 2020-09-29

## 2020-10-26 RX ORDER — AMLODIPINE BESYLATE 5 MG/1
TABLET ORAL
Qty: 30 TABLET | Refills: 0 | Status: SHIPPED | OUTPATIENT
Start: 2020-10-26 | End: 2020-11-30

## 2020-11-30 RX ORDER — AMLODIPINE BESYLATE 5 MG/1
TABLET ORAL
Qty: 30 TABLET | Refills: 0 | Status: SHIPPED | OUTPATIENT
Start: 2020-11-30 | End: 2020-12-28

## 2020-12-28 RX ORDER — AMLODIPINE BESYLATE 5 MG/1
TABLET ORAL
Qty: 30 TABLET | Refills: 1 | Status: SHIPPED | OUTPATIENT
Start: 2020-12-28

## 2021-01-25 RX ORDER — ATORVASTATIN CALCIUM 20 MG/1
20 TABLET, FILM COATED ORAL DAILY
Qty: 90 TABLET | Refills: 0 | OUTPATIENT
Start: 2021-01-25

## 2021-02-01 RX ORDER — ATORVASTATIN CALCIUM 20 MG/1
20 TABLET, FILM COATED ORAL DAILY
Qty: 90 TABLET | Refills: 0 | OUTPATIENT
Start: 2021-02-01

## 2021-02-01 RX ORDER — AMLODIPINE BESYLATE 5 MG/1
5 TABLET ORAL DAILY
Qty: 30 TABLET | Refills: 1 | OUTPATIENT
Start: 2021-02-01

## 2021-02-11 ENCOUNTER — OFFICE VISIT (OUTPATIENT)
Dept: SLEEP MEDICINE | Facility: HOSPITAL | Age: 56
End: 2021-02-11

## 2021-02-11 VITALS
HEART RATE: 90 BPM | OXYGEN SATURATION: 97 % | SYSTOLIC BLOOD PRESSURE: 136 MMHG | WEIGHT: 201 LBS | DIASTOLIC BLOOD PRESSURE: 85 MMHG | HEIGHT: 68 IN | BODY MASS INDEX: 30.46 KG/M2

## 2021-02-11 DIAGNOSIS — G47.33 OBSTRUCTIVE SLEEP APNEA: Primary | ICD-10-CM

## 2021-02-11 DIAGNOSIS — E66.9 OBESITY (BMI 30-39.9): ICD-10-CM

## 2021-02-11 PROCEDURE — G0463 HOSPITAL OUTPT CLINIC VISIT: HCPCS

## 2021-02-11 NOTE — PROGRESS NOTES
[facility]  Telephone: [phone] / Fax: [phone] [place]  Telephone: [phone] / Fax: [phone] Jackie Shanks    PCP: Guido Basilio Jr., MD    Reason for visit: MOE f/u    Derian Lynn is a 56 y.o.male  was last seen at [facility] sleep lab in February 2020. He is here today to review CPAP use and response to therapy. His sleep schedule is [id number]. ESS is 5. He wakes up in the middle of the night to adjust the mask. When he goes on trips, he does not always take his CPAP with him. He feels that sleep quality has improved on CPAP and excessive sleepiness/snoring is no longer an issue.  There is no complaint of aerophagia, excessive dry mouth or air leak. His ESS is 5.  His machine is set up auto 5-15cm. Pressures appear to be adequate. Interval history reviewed. His health is unchanged. He is now on a plant base diet and is feeling great.    SH- no tobacco, no alcohol, no energy drinks, 3 caffeine per day.    ROS- negative.    DME [facility]    Current Medications:    Current Outpatient Medications:   •  amLODIPine (NORVASC) 5 MG tablet, TAKE 1 TABLET BY MOUTH EVERY DAY , Disp: 30 tablet, Rfl: 1  •  atorvastatin (LIPITOR) 20 MG tablet, TAKE 1 TABLET BY MOUTH ONE TIME A DAY , Disp: 90 tablet, Rfl: 0  •  Chromium Picolinate 200 MCG capsule, Take  by mouth., Disp: , Rfl:   •  Cinnamon 500 MG tablet, Take  by mouth 2 (two) times a day., Disp: , Rfl:   •  coenzyme Q10 100 MG capsule, Take 100 mg by mouth 2 (two) times a day., Disp: , Rfl:   •  irbesartan (AVAPRO) 150 MG tablet, Take 1 tablet by mouth Daily. One PO daily for bp, Disp: 90 tablet, Rfl: 3  •  Multiple Vitamin (MULTI VITAMIN DAILY PO), Take  by mouth., Disp: , Rfl:   •  valsartan (DIOVAN) 160 MG tablet, TAKE 1/2 TABLET BY MOUTH ONE TIME A DAY FOR BLOOD PRESSURE, Disp: 15 tablet, Rfl: 0   also entered in Sleep Questionnaire    Patient  has a past medical history of Diabetes mellitus (CMS/HCC), Hyperlipidemia,  "and Hypertension.    I have reviewed the Past Medical History, Past Surgical History, Social History and Family History.    Vital Signs /85   Pulse 90   Ht 172.7 cm (68\")   Wt 91.2 kg (201 lb)   SpO2 97%   BMI 30.56 kg/m²  Body mass index is 30.56 kg/m².    General Alert and oriented. No acute distress noted   Pharynx/Throat Class IV Mallampati airway, large tongue, no evidence of redundant lateral pharyngeal tissue. No oral lesions. No thrush. Moist mucous membranes.   Head Normocephalic. Symmetrical. Atraumatic.    Nose No septal deviation. No drainage   Chest Wall Normal shape. Symmetric expansion with respiration. No tenderness.   Neck Trachea midline, no thyromegaly or adenopathy    Lungs Clear to auscultation bilaterally. No wheezes. No rhonchi. No rales. Respirations regular, even and unlabored.   Heart Regular rhythm and normal rate. Normal S1 and S2. No murmur   Abdomen Soft, non-tender and non-distended. Normal bowel sounds. No masses.   Extremities Moves all extremities well. No edema   Psychiatric Normal mood and affect.     Testing:  · Download 11/13/20-2/10/21- 67% use with avg nightly use of 4 hours and 3 minutes on auto CPAP 5-15cm, with avg pressure of 8.0.    · Study-Study-3/24/17- Apnea hypopneas index 12.1 events an hour indicating mild obstructive sleep apnea.  In supine position index is 22.5 indicating moderate obstructive sleep apnea.  Snoring for 20% of sleep time.  Saturation below 89% for 30 minutes.       Impression:  1. Obstructive sleep apnea    2. Obesity (BMI 30-39.9)          Plan:  Patient uses the CPAP device and benefits from its use in terms of reduction of hypersomnia and snoring.  AHI appears to be within adequate range. I reviewed download report and original sleep study report with the patient. He is considering buying supplies on [employer] due to high deductible and large out of pocket expenses with [employer]. We looked at different masks today. I showed him DreamWisp, " DreamWear pillows. He would like to stay with DreamWear nasal cushion.    Weight loss will be strongly beneficial to reduce the severity of sleep-disordered breathing.  Caution during activities that require prolonged concentration is strongly advised if sleepiness returns. Changing of PAP supplies regularly is important for effective use.  Nasal pillows should be changed twice per month, tubing every 3 months, disposable filters twice per  month, water chamber every 6 months. Nasal mask frame should be changed every 3 months, nasal mask cushion twice per month and headgear every 6 months. Full face mask frame should be replaced every 3 months, full face cushion monthly and headgear every 6 months..    Follow up with Dr. Garcia in one year    Total encounter time 20 min    Thank you for allowing me to participate in your patient's care.      CELESTINA Levine  [facility]  Phone: [phone]      Part of this note may be an electronic transcription/translation of spoken language to printed text using the Dragon Dictation System.

## 2021-03-30 ENCOUNTER — BULK ORDERING (OUTPATIENT)
Dept: CASE MANAGEMENT | Facility: OTHER | Age: 56
End: 2021-03-30

## 2021-03-30 DIAGNOSIS — Z23 IMMUNIZATION DUE: ICD-10-CM

## 2021-04-13 ENCOUNTER — IMMUNIZATION (OUTPATIENT)
Dept: VACCINE CLINIC | Facility: HOSPITAL | Age: 56
End: 2021-04-13

## 2021-04-13 DIAGNOSIS — Z23 IMMUNIZATION DUE: ICD-10-CM

## 2021-04-13 PROCEDURE — 0001A: CPT | Performed by: INTERNAL MEDICINE

## 2021-04-13 PROCEDURE — 91300 HC SARSCOV02 VAC 30MCG/0.3ML IM: CPT | Performed by: INTERNAL MEDICINE

## 2021-05-04 ENCOUNTER — IMMUNIZATION (OUTPATIENT)
Dept: VACCINE CLINIC | Facility: HOSPITAL | Age: 56
End: 2021-05-04

## 2021-05-04 PROCEDURE — 91300 HC SARSCOV02 VAC 30MCG/0.3ML IM: CPT | Performed by: INTERNAL MEDICINE

## 2021-05-04 PROCEDURE — 0002A: CPT | Performed by: INTERNAL MEDICINE

## 2022-02-11 ENCOUNTER — OFFICE VISIT (OUTPATIENT)
Dept: SLEEP MEDICINE | Facility: HOSPITAL | Age: 57
End: 2022-02-11

## 2022-02-11 VITALS
OXYGEN SATURATION: 98 % | HEIGHT: 68 IN | SYSTOLIC BLOOD PRESSURE: 142 MMHG | DIASTOLIC BLOOD PRESSURE: 101 MMHG | WEIGHT: 204.2 LBS | HEART RATE: 75 BPM | BODY MASS INDEX: 30.95 KG/M2

## 2022-02-11 DIAGNOSIS — E66.9 OBESITY (BMI 30-39.9): ICD-10-CM

## 2022-02-11 DIAGNOSIS — G47.33 OBSTRUCTIVE SLEEP APNEA: Primary | ICD-10-CM

## 2022-02-11 PROCEDURE — G0463 HOSPITAL OUTPT CLINIC VISIT: HCPCS

## 2022-02-11 NOTE — PROGRESS NOTES
"[facility]  Telephone: [phone] / Fax: [phone] [place]  Telephone: [phone] / Fax: [phone] Jackie Shanks    PCP: Guido Basilio Jr., MD (Inactive)    Reason for visit: MOE f/u    Derian Lynn is a 57 y.o.male  was last seen at [facility] sleep lab 1 year ago. His machine is recalled. He removed the humidifier. It is a little uncomfortable using it without it due to dryness, but he continues to use it regardless. He finds himself waking up more. He wakes up every morning around 3am and takes the mask off.  He registered the device last summer.  He uses DreamWear nasal cushion mask. It does not leak and fits well.  He goes to bed at 11pm and is up at 6am. ESS is 3.     SH- no tobacco, no alcohol, 2 caffeine per day.    ROS negative.    DME [facility].    Current Medications:    Current Outpatient Medications:   •  amLODIPine (NORVASC) 5 MG tablet, TAKE 1 TABLET BY MOUTH EVERY DAY , Disp: 30 tablet, Rfl: 1  •  atorvastatin (LIPITOR) 20 MG tablet, TAKE 1 TABLET BY MOUTH ONE TIME A DAY , Disp: 90 tablet, Rfl: 0  •  Chromium Picolinate 200 MCG capsule, Take  by mouth., Disp: , Rfl:   •  Cinnamon 500 MG tablet, Take  by mouth 2 (two) times a day., Disp: , Rfl:   •  coenzyme Q10 100 MG capsule, Take 100 mg by mouth 2 (two) times a day., Disp: , Rfl:   •  irbesartan (AVAPRO) 150 MG tablet, Take 1 tablet by mouth Daily. One PO daily for bp, Disp: 90 tablet, Rfl: 3  •  Multiple Vitamin (MULTI VITAMIN DAILY PO), Take  by mouth., Disp: , Rfl:   •  valsartan (DIOVAN) 160 MG tablet, TAKE 1/2 TABLET BY MOUTH ONE TIME A DAY FOR BLOOD PRESSURE, Disp: 15 tablet, Rfl: 0   also entered in Sleep Questionnaire    Patient  has a past medical history of Diabetes mellitus (CMS/HCC), Hyperlipidemia, and Hypertension.    I have reviewed the Past Medical History, Past Surgical History, Social History and Family History.    Vital Signs BP (!) 142/101   Pulse 75   Ht 172.7 cm (68\")   Wt 92.6 kg (204 " lb 3.2 oz)   SpO2 98%   BMI 31.05 kg/m²  Body mass index is 31.05 kg/m².    General Alert and oriented. No acute distress noted   Pharynx/Throat Class IV Mallampati airway, large tongue, no evidence of redundant lateral pharyngeal tissue. No oral lesions. No thrush. Moist mucous membranes.   Head Normocephalic. Symmetrical. Atraumatic.    Nose No septal deviation. No drainage   Chest Wall Normal shape. Symmetric expansion with respiration. No tenderness.   Neck Trachea midline, no thyromegaly or adenopathy    Lungs Clear to auscultation bilaterally. No wheezes. No rhonchi. No rales. Respirations regular, even and unlabored.   Heart Regular rhythm and normal rate. Normal S1 and S2. No murmur   Abdomen Soft, non-tender and non-distended. Normal bowel sounds. No masses.   Extremities Moves all extremities well. No edema   Psychiatric Normal mood and affect.     Testing:  · Download 11/13/21-2/10/22 75% use with average nightly use of 3 hours and 13 minutes on auto CPAP 5-15cm H2O, average pressure of 8.6 cm H2O, AHI 3.1, leak of 8 seconds.    · Study-Study-Study-3/24/17- Apnea hypopneas index 12.1 events an hour indicating mild obstructive sleep apnea.  In supine position index is 22.5 indicating moderate obstructive sleep apnea.  Snoring for 20% of sleep time.  Saturation below 89% for 30 minutes.       Impression:  1. Obstructive sleep apnea    2. Obesity (BMI 30-39.9)          Plan:  Awaiting replacement through [employer]. Currently doing great. I reviewed original sleep study and recent data from the machine with patient. His machine is set at 5-15cm H2O. There is no significant leak. I advised him to increase usage to at least 4 hours per night. I will send order to [facility] to renew CPAP accessories.    F/u Dr Garcia in 1 year.      Thank you for allowing me to participate in your patient's care.      CELESTINA Levine  [facility]  Phone: [phone]      Part of this note may be an electronic  transcription/translation of spoken language to printed text using the Dragon Dictation System.

## 2024-02-27 ENCOUNTER — OFFICE VISIT (OUTPATIENT)
Dept: SLEEP MEDICINE | Facility: HOSPITAL | Age: 59
End: 2024-02-27
Payer: COMMERCIAL

## 2024-02-27 VITALS
SYSTOLIC BLOOD PRESSURE: 125 MMHG | BODY MASS INDEX: 30.68 KG/M2 | HEIGHT: 68 IN | WEIGHT: 202.4 LBS | OXYGEN SATURATION: 96 % | DIASTOLIC BLOOD PRESSURE: 87 MMHG | HEART RATE: 74 BPM

## 2024-02-27 DIAGNOSIS — E66.9 OBESITY (BMI 30-39.9): ICD-10-CM

## 2024-02-27 DIAGNOSIS — G47.33 OBSTRUCTIVE SLEEP APNEA: Primary | ICD-10-CM

## 2024-02-27 PROCEDURE — G0463 HOSPITAL OUTPT CLINIC VISIT: HCPCS

## 2024-02-27 NOTE — PROGRESS NOTES
"[facility] SLEEP MEDICINE  [address]  [address]  [facility] [address] [phone]    PCP: Guido Basilio Jr., MD (Inactive)    Reason for visit:  Sleep disorders: MOE    Derian \"Tonio" is a 59 y.o.male who was seen in the Sleep Disorders Center today. Last seen 2022. He got his replacement device 1 yr ago. Most nights he takes off the device after 2-3 hrs and then falls back to sleep. He sleeps from 11pm to 6am. He is rested when he awakens. He uses nasal cushions and they tend to move, major limitation to using device all night.  [place] Sleepiness Scale is 5. Caffeine 2-3 per day. Alcohol 0 per week.    Derian \"Reinier\"  reports that he has never smoked. He does not have any smokeless tobacco history on file.    Pertinent Positive Review of Systems of denies  Rest of Review of Systems was negative as recorded in Sleep Questionnaire.    Patient  has a past medical history of Diabetes mellitus, Hyperlipidemia, and Hypertension.     Current Medications:    Current Outpatient Medications:     amLODIPine (NORVASC) 5 MG tablet, TAKE 1 TABLET BY MOUTH EVERY DAY , Disp: 30 tablet, Rfl: 1    atorvastatin (LIPITOR) 20 MG tablet, TAKE 1 TABLET BY MOUTH ONE TIME A DAY , Disp: 90 tablet, Rfl: 0    Chromium Picolinate 200 MCG capsule, Take  by mouth., Disp: , Rfl:     Cinnamon 500 MG tablet, Take  by mouth 2 (two) times a day., Disp: , Rfl:     coenzyme Q10 100 MG capsule, Take 100 mg by mouth 2 (two) times a day., Disp: , Rfl:     irbesartan (AVAPRO) 150 MG tablet, Take 1 tablet by mouth Daily. One PO daily for bp, Disp: 90 tablet, Rfl: 3    Multiple Vitamin (MULTI VITAMIN DAILY PO), Take  by mouth., Disp: , Rfl:     valsartan (DIOVAN) 160 MG tablet, TAKE 1/2 TABLET BY MOUTH ONE TIME A DAY FOR BLOOD PRESSURE, Disp: 15 tablet, Rfl: 0   also entered in Sleep Questionnaire         Vital Signs: /87   Pulse 74   Ht 172.7 cm (68\")   Wt 91.8 kg (202 lb 6.4 oz)   SpO2 96%   BMI 30.77 kg/m²     Body " "mass index is 30.77 kg/m².       Tongue: Large       Dentition: good       Pharynx: Posterior pharyngeal pillars are narrow   Mallampatti: III (soft and hard palate and base of uvula visible)        General: Alert. Cooperative. Well developed. No acute distress.             Head:  Normocephalic. Symmetrical. Atraumatic.              Nose: No septal deviation. No drainage.          Throat: No oral lesions. No thrush. Moist mucous membranes.    Chest Wall:  Normal shape. Symmetric expansion with respiration. No tenderness.             Neck:  Trachea midline.           Lungs:  Clear to auscultation bilaterally. No wheezes. No rhonchi. No rales. Respirations regular, even and unlabored.            Heart:  Regular rhythm and normal rate. Normal S1 and S2. No murmur.     Abdomen:  Soft, non-tender and non-distended. Normal bowel sounds. No masses.  Extremities:  Moves all extremities well. No edema.    Psychiatric: Normal mood and affect.    Diagnostic data available to date is as below and was reviewed on current visit:  3/24/17- Apnea hypopneas index 12.1 events an hour indicating mild obstructive sleep apnea.  In supine position index is 22.5 indicating moderate obstructive sleep apnea.  Snoring for 20% of sleep time.  Saturation below 89% for 30 minutes.    No results found for: \"IRON\", \"TIBC\", \"FERRITIN\"    Most current available usage data reviewed on 02/27/2024:        [employer]: buys online    Prescription to [employer] for replacement supplies as below:    New fit    New fit  Description Replacement    Nasal PILLOWS      A 7034 Nasal Pillows  every 3 mth    A 7033 Repl Nasal Pillows  2 per mth    Nasal MASK/CUSHION      A 7034 Nasal Mask/Cushion  every 3 mth    A 7032 Repl Nasal Mask/Cushion  2 per mth    Full Face MASK      A 7030 Full Face Mask  every 3 mth    A 7031 Repl Face Mask  1 per mth      A 4604 Heated Tubing  every 3 mth    A 7037 Standard Tubing  every 3 mth   x A 7035 Headgear  every 3 mth   x A 7046 " "Repl Humidifier Chamber  every 6 yrs   x A 7038 Disposable Filters  2 per mth   x A 7039 Non-disposable Filter  every 6 mth   x A 7036 Chin Strap  every 6 mth     Orders Placed This Encounter   Procedures    SCANNED - PULMONARY RESULTS          Impression:  1. Obstructive sleep apnea    2. Obesity (BMI 30-39.9)        Plan:  Derian \"Reinier\" will get better fitting mask then use device all night long. Since he buys online, will schedule with sleep tech. Has mild MOE but moderate in supine position with associated sleep hypoxia. Therefore reminded of need to treat the sleep apnea to prevent health issues related.    I reiterated the importance of effective treatment of obstructive sleep apnea with PAP machine.  Cardiovascular health risks of untreated sleep apnea were again reviewed.  Patient was asked to remain cautious if there is persistent hypersomnolence. The benefit of weight loss in reducing severity of obstructive sleep apnea was discussed.  Patient would benefit from adhering to a strict diet to achieve ideal BMI.     Change of PAP supplies regularly is important for effective use.  Change of cushion on the mask or plugs on nasal pillows along with disposable filters once every month and change of mask frame, tubing, headgear and Velcro straps every 6 months at the minimum was reiterated.    Patient will follow up in this clinic in 6 months  APRN    Thank you for allowing me to participate in your patient's care.    Electronically signed by Eder Garcia MD, 02/27/24, 11:20 AM EST.    Part of this note may be an electronic transcription/translation of spoken language to printed text using the Dragon Dictation System.  "

## 2024-08-28 ENCOUNTER — OFFICE VISIT (OUTPATIENT)
Dept: SLEEP MEDICINE | Facility: HOSPITAL | Age: 59
End: 2024-08-28
Payer: COMMERCIAL

## 2024-08-28 VITALS
WEIGHT: 206.6 LBS | HEART RATE: 91 BPM | BODY MASS INDEX: 31.31 KG/M2 | DIASTOLIC BLOOD PRESSURE: 83 MMHG | HEIGHT: 68 IN | SYSTOLIC BLOOD PRESSURE: 127 MMHG | OXYGEN SATURATION: 96 %

## 2024-08-28 DIAGNOSIS — G47.33 OBSTRUCTIVE SLEEP APNEA: Primary | ICD-10-CM

## 2024-08-28 DIAGNOSIS — E66.9 OBESITY (BMI 30-39.9): ICD-10-CM

## 2024-08-28 PROCEDURE — G0463 HOSPITAL OUTPT CLINIC VISIT: HCPCS

## 2024-08-28 NOTE — PROGRESS NOTES
"[facility]  Telephone: [phone] / Fax: [phone] [place]  Telephone: [phone] / Fax: [phone] Jackie Shanks    PCP: Saúl Hubbard MD    Reason for visit: MOE f/u    Derian Lynn is a 59 y.o.male  was last seen at [facility] sleep lab in Feb 2024.  He has mild underlying MOE with AHI 12/hr. CPAP compliance improved since last review. Device is set at 5-15cm H2O, pressures appear adequate/comfortable. His health has been stable. BP and HLD controlled on meds.  He received his original machine in 2017. It was then replaced through [facility] [facility] center in 2023. He is happy with the machine in general. He has high deductible plan. Gets supplies renewed on [employer]    SH- no tobacco, no alcohol    ROS- negative.    DME [employer]    Current Medications:    Current Outpatient Medications:     amLODIPine (NORVASC) 5 MG tablet, TAKE 1 TABLET BY MOUTH EVERY DAY , Disp: 30 tablet, Rfl: 1    atorvastatin (LIPITOR) 20 MG tablet, TAKE 1 TABLET BY MOUTH ONE TIME A DAY , Disp: 90 tablet, Rfl: 0    Chromium Picolinate 200 MCG capsule, Take  by mouth., Disp: , Rfl:     Cinnamon 500 MG tablet, Take  by mouth 2 (two) times a day., Disp: , Rfl:     coenzyme Q10 100 MG capsule, Take 100 mg by mouth 2 (two) times a day., Disp: , Rfl:     irbesartan (AVAPRO) 150 MG tablet, Take 1 tablet by mouth Daily. One PO daily for bp, Disp: 90 tablet, Rfl: 3    Multiple Vitamin (MULTI VITAMIN DAILY PO), Take  by mouth., Disp: , Rfl:     valsartan (DIOVAN) 160 MG tablet, TAKE 1/2 TABLET BY MOUTH ONE TIME A DAY FOR BLOOD PRESSURE, Disp: 15 tablet, Rfl: 0   also entered in Sleep Questionnaire    Patient  has a past medical history of Diabetes mellitus, Hyperlipidemia, and Hypertension.    I have reviewed the Past Medical History, Past Surgical History, Social History and Family History.    Vital Signs /83   Pulse 91   Ht 172.7 cm (68\")   Wt 93.7 kg (206 lb 9.6 oz)   SpO2 96%   BMI 31.41 kg/m²  Body " mass index is 31.41 kg/m².    General Alert and oriented. No acute distress noted   Pharynx/Throat Class  IV  Mallampati airway, large tongue, no evidence of redundant lateral pharyngeal tissue. No oral lesions. No thrush. Moist mucous membranes.   Head Normocephalic. Symmetrical. Atraumatic.    Nose No septal deviation. No drainage   Chest Wall Normal shape. Symmetric expansion with respiration. No tenderness.   Neck Trachea midline, no thyromegaly or adenopathy    Lungs Clear to auscultation bilaterally. No wheezes. No rhonchi. No rales. Respirations regular, even and unlabored.   Heart Regular rhythm and normal rate. Normal S1 and S2. No murmur   Abdomen Soft, non-tender and non-distended. Normal bowel sounds. No masses.   Extremities Moves all extremities well. No edema   Psychiatric Normal mood and affect.     Testing:  Download 5/29/24-8/26/24, 82% use with average nightly use of  4 hours and 16 minutes on auto CPAP 5-15cm H2O, avg pr is 7cm H2O, AHI is 2/hr, leak is 1 min and 56 seconds.    Diagnostic Analysis:   The patient had a monitoring time of 457 minutes. The data obtained make this a technically adequate study. Apnea hypopneas index 12.1 events an hour indicating mild obstructive sleep apnea.  In supine position index is 22.5 indicating moderate obstructive sleep apnea.  Snoring for 20% of sleep time.  Saturation below 89% for 30 minutes.    Impression:  1. Obstructive sleep apnea    2. Obesity (BMI 30-39.9)          Plan:  Patient uses the CPAP device and benefits from its use in terms of reduction of hypersomnia and snoring.  AHI appears to be within adequate range. I reviewed download report and original sleep study report with the patient. I advised pt to contact us if PAP pressures feel excessive or insufficient.    Weight loss will be strongly beneficial to reduce the severity of sleep-disordered breathing.     Follow up with Dr. Garcia in one year    Thank you for allowing me to participate in  your patient's care.      CELESTINA Levine  [facility]  Phone: [phone]      Part of this note may be an electronic transcription/translation of spoken language to printed text using the Dragon Dictation System.

## 2025-08-26 ENCOUNTER — OFFICE VISIT (OUTPATIENT)
Dept: SLEEP MEDICINE | Facility: HOSPITAL | Age: 60
End: 2025-08-26
Payer: COMMERCIAL

## 2025-08-26 VITALS
HEART RATE: 71 BPM | OXYGEN SATURATION: 97 % | DIASTOLIC BLOOD PRESSURE: 80 MMHG | SYSTOLIC BLOOD PRESSURE: 121 MMHG | HEIGHT: 68 IN | BODY MASS INDEX: 31.07 KG/M2 | WEIGHT: 205 LBS

## 2025-08-26 DIAGNOSIS — E66.9 OBESITY (BMI 30-39.9): ICD-10-CM

## 2025-08-26 DIAGNOSIS — G47.33 OBSTRUCTIVE SLEEP APNEA, ADULT: Primary | ICD-10-CM

## 2025-08-26 PROCEDURE — G0463 HOSPITAL OUTPT CLINIC VISIT: HCPCS
